# Patient Record
Sex: MALE | Race: BLACK OR AFRICAN AMERICAN | URBAN - METROPOLITAN AREA
[De-identification: names, ages, dates, MRNs, and addresses within clinical notes are randomized per-mention and may not be internally consistent; named-entity substitution may affect disease eponyms.]

---

## 2019-03-15 ENCOUNTER — OFFICE VISIT (OUTPATIENT)
Dept: URBAN - METROPOLITAN AREA CLINIC 33 | Facility: CLINIC | Age: 74
End: 2019-03-15
Payer: MEDICARE

## 2019-03-15 DIAGNOSIS — H40.013 OPEN ANGLE WITH BORDERLINE FINDINGS, LOW RISK, BILATERAL: ICD-10-CM

## 2019-03-15 DIAGNOSIS — H26.491 OTHER SECONDARY CATARACT, RIGHT EYE: ICD-10-CM

## 2019-03-15 DIAGNOSIS — Z96.1 PRESENCE OF PSEUDOPHAKIA: ICD-10-CM

## 2019-03-15 DIAGNOSIS — E11.3393 TYPE 2 DIAB W MODERATE NONPRLF DIAB RTNOP W/O MACULAR EDEMA, BILATERAL: Primary | ICD-10-CM

## 2019-03-15 PROCEDURE — 92004 COMPRE OPH EXAM NEW PT 1/>: CPT | Performed by: OPTOMETRIST

## 2019-03-15 ASSESSMENT — VISUAL ACUITY
OS: 20/25
OD: 20/200

## 2019-03-15 ASSESSMENT — INTRAOCULAR PRESSURE
OS: 12
OD: 9
OD: 12

## 2019-03-15 ASSESSMENT — KERATOMETRY
OD: 43.25
OS: 42.88

## 2019-03-15 NOTE — IMPRESSION/PLAN
Impression: Type 2 diab w moderate nonprlf diab rtnop w/o macular edema, bilateral: L61.8089.  Plan:

## 2019-03-15 NOTE — IMPRESSION/PLAN
Impression: Open angle with borderline findings, low risk, bilateral: H40.013.   IOP low OU without drops Plan:

## 2019-03-19 ENCOUNTER — OFFICE VISIT (OUTPATIENT)
Dept: URBAN - METROPOLITAN AREA CLINIC 33 | Facility: CLINIC | Age: 74
End: 2019-03-19
Payer: MEDICARE

## 2019-03-19 DIAGNOSIS — H26.493 OTHER SECONDARY CATARACT, BILATERAL: ICD-10-CM

## 2019-03-19 PROCEDURE — 99214 OFFICE O/P EST MOD 30 MIN: CPT | Performed by: OPHTHALMOLOGY

## 2019-03-19 PROCEDURE — 99203 OFFICE O/P NEW LOW 30 MIN: CPT | Performed by: OPHTHALMOLOGY

## 2019-03-19 ASSESSMENT — VISUAL ACUITY
OD: 20/200
OS: 20/25

## 2019-03-19 ASSESSMENT — INTRAOCULAR PRESSURE
OD: 10
OS: 12

## 2019-03-19 NOTE — IMPRESSION/PLAN
Impression: Type 2 diab w moderate nonprlf diab rtnop w/o macular edema, bilateral: U95.7935. Plan:   Discussed ocular and systemic benefits of blood sugar control. Advised patient the importance of good BS control.

## 2019-03-19 NOTE — IMPRESSION/PLAN
Impression: Open angle with borderline findings, low risk, bilateral: H40.013. IOP low OU without drops Plan: Continue follow up care with Dr Aysha De Luna.

## 2019-03-19 NOTE — IMPRESSION/PLAN
Impression: Other secondary cataract, bilateral: H26.493. Plan: Posterior capsular opacification present on lens implant and causing decreased vision. Proceed with YAG capsulotomy treatment- OD then OS ( Pt wants to wait on Yag OS) Discussed R/B/I  with patient. Use Durezol BID OU for 4 days after Laser.

## 2019-04-02 ENCOUNTER — SURGERY (OUTPATIENT)
Dept: URBAN - METROPOLITAN AREA SURGERY 15 | Facility: SURGERY | Age: 74
End: 2019-04-02
Payer: COMMERCIAL

## 2019-04-02 PROCEDURE — 66821 AFTER CATARACT LASER SURGERY: CPT | Performed by: OPHTHALMOLOGY

## 2019-04-09 ENCOUNTER — POST-OPERATIVE VISIT (OUTPATIENT)
Dept: URBAN - METROPOLITAN AREA CLINIC 33 | Facility: CLINIC | Age: 74
End: 2019-04-09

## 2019-04-09 DIAGNOSIS — Z09 ENCNTR FOR F/U EXAM AFT TRTMT FOR COND OTH THAN MALIG NEOPLM: Primary | ICD-10-CM

## 2019-04-09 PROCEDURE — 99024 POSTOP FOLLOW-UP VISIT: CPT | Performed by: OPTOMETRIST

## 2019-04-09 ASSESSMENT — INTRAOCULAR PRESSURE
OS: 13
OD: 12

## 2021-02-10 ENCOUNTER — OFFICE VISIT (OUTPATIENT)
Dept: URBAN - METROPOLITAN AREA CLINIC 23 | Facility: CLINIC | Age: 76
End: 2021-02-10
Payer: COMMERCIAL

## 2021-02-10 PROCEDURE — 92014 COMPRE OPH EXAM EST PT 1/>: CPT | Performed by: OPTOMETRIST

## 2021-02-10 ASSESSMENT — INTRAOCULAR PRESSURE
OD: 16
OS: 16

## 2021-02-10 NOTE — IMPRESSION/PLAN
Impression: Type 2 diabetes mellitus w/ proliferative diabetic retinopathy w/o macular edema, right eye: K12.6638. Plan: Discussed diagnosis in detail with patient. Advised and emphasized to the patient about blood sugar control. Discussed risks of progression and poor compliance can lead to blindness. Will refer to Retinal Specialist due to findings.

## 2021-02-10 NOTE — IMPRESSION/PLAN
Impression: Vitreous hemorrhage, left eye: H43.12. Plan: Advised patient of condition. Reassured patient of current condition and treatment. Consult recommended [[facility]].

## 2021-02-19 ENCOUNTER — OFFICE VISIT (OUTPATIENT)
Dept: URBAN - METROPOLITAN AREA CLINIC 23 | Facility: CLINIC | Age: 76
End: 2021-02-19
Payer: COMMERCIAL

## 2021-02-19 DIAGNOSIS — H43.12 VITREOUS HEMORRHAGE, LEFT EYE: ICD-10-CM

## 2021-02-19 DIAGNOSIS — H43.312 VITREOUS MEMBRANES AND STRANDS, LEFT EYE: ICD-10-CM

## 2021-02-19 DIAGNOSIS — E11.3591 TYPE 2 DIABETES MELLITUS W/ PROLIFERATIVE DIABETIC RETINOPATHY W/O MACULAR EDEMA, RIGHT EYE: ICD-10-CM

## 2021-02-19 DIAGNOSIS — E11.3512 TYPE 2 DIABETES MELLITUS W/ PROLIFERATIVE DIABETIC RETINOPATHY W/ MACULAR EDEMA, LEFT EYE: Primary | ICD-10-CM

## 2021-02-19 PROCEDURE — 92134 CPTRZ OPH DX IMG PST SGM RTA: CPT | Performed by: OPHTHALMOLOGY

## 2021-02-19 PROCEDURE — 99214 OFFICE O/P EST MOD 30 MIN: CPT | Performed by: OPHTHALMOLOGY

## 2021-02-19 RX ORDER — OFLOXACIN 3 MG/ML
0.3 % SOLUTION/ DROPS OPHTHALMIC
Qty: 5 | Refills: 3 | Status: ACTIVE
Start: 2021-02-19

## 2021-02-19 RX ORDER — PREDNISOLONE ACETATE 10 MG/ML
1 % SUSPENSION/ DROPS OPHTHALMIC
Qty: 10 | Refills: 5 | Status: ACTIVE
Start: 2021-02-19

## 2021-02-19 ASSESSMENT — INTRAOCULAR PRESSURE
OD: 15
OS: 16

## 2021-02-19 NOTE — IMPRESSION/PLAN
Impression: Vitreous hemorrhage, associated with PDR left eye: H43.12. Left. Condition: unstable. Vision: vision affected. Plan: Discussed diagnosis in detail with patient. Discussed risks of progression. Recommend surgery OS - see notes above.

## 2021-02-19 NOTE — IMPRESSION/PLAN
Impression: Vitreous membranes and strands, left eye: H43.312. Left. Condition: unstable. Vision: vision affected. Plan: Discussed diagnosis in detail with patient. Discussed risks of progression. Recommend surgery OS - see notes above.

## 2021-02-19 NOTE — IMPRESSION/PLAN
Impression: Type 2 diabetes mellitus w/ proliferative diabetic retinopathy w/ macular edema, left eye: W76.7011. Left. Condition: unstable. Vision: vision affected.
h/o previous PRP OS done elsewhere Plan: Due to Coronavirus COVID-19 pandemic and National Emergency, deferred Slit Lamp examination. Findings are based on OCT and Optos. OCT shows VMT foveal OS and Optos shows PRP, VMT, fresh VH OS Discussed diagnosis in detail with patient. Discussed risks of progression. Surgical treatment is recommended in order to remove the blood for possible vision improvement PPVx LEFT EYE. Surgical risks and benefits were discussed, explained and understood by patient. All questions answered. RL1. Educational material provided to patient. Patient understands that additional HIEU treatments or laser treatments may be needed in the future. 
Erxed drops to pharmacy on file Ofloxacin and Prednisolone

## 2021-02-19 NOTE — IMPRESSION/PLAN
Impression: Type 2 diabetes mellitus w/ proliferative diabetic retinopathy w/o macular edema, right eye: T48.2586. Right. Condition: stable. Vision: vision not affected. h/o PRP OD done else where Plan: Due to Coronavirus COVID-19 pandemic and National Emergency, deferred Slit Lamp examination. Findings are based on OCT and Optos. OCT is stable no active edema OD and Optos shows PRP, quiet OD Based on diagnostic findings recommend observation for now. Will reassess condition in 6 wks. Emphasized blood sugar control and advised to keep future appointments with PCP and/or Endocrinologist for the management of Diabetes.

## 2021-03-30 ENCOUNTER — SURGERY (OUTPATIENT)
Dept: URBAN - METROPOLITAN AREA SURGERY 11 | Facility: SURGERY | Age: 76
End: 2021-03-30
Payer: COMMERCIAL

## 2021-03-30 PROCEDURE — 67041 VIT FOR MACULAR PUCKER: CPT | Performed by: OPHTHALMOLOGY

## 2021-03-31 ENCOUNTER — POST-OPERATIVE VISIT (OUTPATIENT)
Dept: URBAN - METROPOLITAN AREA CLINIC 23 | Facility: CLINIC | Age: 76
End: 2021-03-31

## 2021-03-31 DIAGNOSIS — Z48.810 ENCOUNTER FOR SURGICAL AFTERCARE FOLLOWING SURGERY ON A SENSE ORGAN: Primary | ICD-10-CM

## 2021-03-31 PROCEDURE — 99024 POSTOP FOLLOW-UP VISIT: CPT | Performed by: OPTOMETRIST

## 2021-03-31 ASSESSMENT — INTRAOCULAR PRESSURE
OD: 12
OS: 13

## 2021-03-31 NOTE — IMPRESSION/PLAN
Impression: S/P PPVx 25ga; Epiretinal Membranectomy; Endo laser OS - 1 Day. Encounter for surgical aftercare following surgery on a sense organ  Z48.810. Post operative instructions reviewed - Plan: Use medication as directed above and on handout. Return if sudden vision change or increasing pain.  --Continue Ofloxacin 0.3%--Continue Prednisolone acetate 1% qid OS

## 2021-04-06 ENCOUNTER — POST-OPERATIVE VISIT (OUTPATIENT)
Dept: URBAN - METROPOLITAN AREA CLINIC 23 | Facility: CLINIC | Age: 76
End: 2021-04-06
Payer: COMMERCIAL

## 2021-04-06 PROCEDURE — 99024 POSTOP FOLLOW-UP VISIT: CPT | Performed by: OPHTHALMOLOGY

## 2021-04-06 ASSESSMENT — INTRAOCULAR PRESSURE
OD: 12
OS: 6

## 2021-04-06 NOTE — IMPRESSION/PLAN
Impression: S/P PPVx 25ga; Epiretinal Membranectomy; Endo laser OS - 7 Days. Encounter for surgical aftercare following surgery on a sense organ  Z48.810. Excellent post op course   Post operative instructions reviewed - Condition is improving - Plan: Due to Coronavirus COVID-19 pandemic and National Emergency, deferred Slit Lamp examination. Findings are based on OCT and Optos. OCT shows a decrease in CMT and Optos shows no VH OS - improving. Recommend to continue using PF OS QID until gone, d/c Ofloxacin after today. Will reassess condition in 1 mo.

## 2022-02-08 ENCOUNTER — OFFICE VISIT (OUTPATIENT)
Dept: URBAN - METROPOLITAN AREA CLINIC 23 | Facility: CLINIC | Age: 77
End: 2022-02-08
Payer: COMMERCIAL

## 2022-02-08 PROCEDURE — 99213 OFFICE O/P EST LOW 20 MIN: CPT | Performed by: OPHTHALMOLOGY

## 2022-02-08 PROCEDURE — 92134 CPTRZ OPH DX IMG PST SGM RTA: CPT | Performed by: OPHTHALMOLOGY

## 2022-02-08 ASSESSMENT — INTRAOCULAR PRESSURE
OD: 11
OS: 13

## 2022-02-08 NOTE — IMPRESSION/PLAN
Impression: Type 2 diabetes mellitus w/ proliferative diabetic retinopathy w/o macular edema, right eye: L44.9279. Right. Condition: stable. Vision: vision not affected. h/o PRP OD done else where Plan: Due to Coronavirus COVID-19 pandemic and National Emergency, deferred Slit Lamp examination. Findings are based on OCT and Optos. OCT is stable no active edema OD and Optos shows PRP, quiet OD. Based on diagnostic findings recommend observation for now. Will reassess condition in 6 mos, sooner if there is a change or decrease in vision. Emphasized blood sugar control and advised to keep future appointments with PCP and/or Endocrinologist for the management of Diabetes.

## 2022-02-08 NOTE — IMPRESSION/PLAN
Impression: Type 2 diabetes mellitus w/ proliferative diabetic retinopathy w/ macular edema, left eye: F25.0815. Left. Condition: stable. Vision: vision improved and stable. s/p PPVX for PDR / VH OS 3/30/2021
h/o previous PRP OS done elsewhere Plan: Due to Coronavirus COVID-19 pandemic and National Emergency, deferred Slit Lamp examination. Findings are based on diagnostic tests. OCT OS shows a decrease in CMT and Optos shows PRP, no VH, the retina is stable OS. Based on diagnostic findings recommend observation for now. Will reassess condition in 6 mos, sooner if there is a change or decrease in vision. Emphasized blood sugar control and advised to keep future appointments with PCP and/or Endocrinologist for the management of Diabetes.

## 2022-11-11 ENCOUNTER — OFFICE VISIT (OUTPATIENT)
Dept: URBAN - METROPOLITAN AREA CLINIC 23 | Facility: CLINIC | Age: 77
End: 2022-11-11
Payer: MEDICARE

## 2022-11-11 DIAGNOSIS — E11.3591 TYPE 2 DIABETES MELLITUS W/ PROLIFERATIVE DIABETIC RETINOPATHY W/O MACULAR EDEMA, RIGHT EYE: ICD-10-CM

## 2022-11-11 DIAGNOSIS — E11.3512 TYPE 2 DIABETES MELLITUS W/ PROLIFERATIVE DIABETIC RETINOPATHY W/ MACULAR EDEMA, LEFT EYE: Primary | ICD-10-CM

## 2022-11-11 PROCEDURE — 99213 OFFICE O/P EST LOW 20 MIN: CPT | Performed by: OPHTHALMOLOGY

## 2022-11-11 PROCEDURE — 92134 CPTRZ OPH DX IMG PST SGM RTA: CPT | Performed by: OPHTHALMOLOGY

## 2022-11-11 ASSESSMENT — INTRAOCULAR PRESSURE
OD: 12
OS: 12

## 2022-11-11 NOTE — IMPRESSION/PLAN
Impression: Type 2 diabetes mellitus w/ proliferative diabetic retinopathy w/ macular edema, left eye: N49.9112. Left. Condition: stable. Vision: vision improved and stable. s/p PPVX for PDR / VH OS 3/30/2021
h/o previous PRP OS done elsewhere Plan: Discussed diagnosis in detail with patient. Exam shows minimal Diabetic changes. No treatment is recommended at this time. Emphasized blood sugar control and advised to keep future appointments with PCP and/or Endocrinologist for the management of Diabetes. Recommend observation for now. OCT shows min ILM changes, no edema and Optos shows stable appearing PRP. WIll reassess the retina in 1 year. Pt may have refraction as desired.

## 2022-11-11 NOTE — IMPRESSION/PLAN
Impression: Type 2 diabetes mellitus w/ proliferative diabetic retinopathy w/o macular edema, right eye: O98.1528. Right. Condition: stable. Vision: vision not affected. h/o PRP OD done else where Plan: Discussed diagnosis in detail with patient. Exam shows no Diabetic changes. No treatment is recommended at this time. Emphasized blood sugar control and advised to keep future appointments with PCP and/or Endocrinologist for the management of Diabetes. Recommend observation for now. OCT shows no edema and Optos shows stable appearing PRP.

## 2024-03-18 ENCOUNTER — OFFICE VISIT (OUTPATIENT)
Dept: URBAN - METROPOLITAN AREA CLINIC 23 | Facility: CLINIC | Age: 79
End: 2024-03-18
Payer: MEDICARE

## 2024-03-18 DIAGNOSIS — H11.32 SUBCONJUNCTIVAL HEMORRHAGE OF LEFT EYE: ICD-10-CM

## 2024-03-18 DIAGNOSIS — E11.3553 TYPE 2 DIABETES WITH STABLE PROLIF DIABETIC RTNOP, BILATERAL: Primary | ICD-10-CM

## 2024-03-18 PROCEDURE — 92134 CPTRZ OPH DX IMG PST SGM RTA: CPT

## 2024-03-18 PROCEDURE — 99214 OFFICE O/P EST MOD 30 MIN: CPT

## 2024-03-18 ASSESSMENT — INTRAOCULAR PRESSURE
OD: 12
OS: 12